# Patient Record
Sex: FEMALE | Race: OTHER | HISPANIC OR LATINO
[De-identification: names, ages, dates, MRNs, and addresses within clinical notes are randomized per-mention and may not be internally consistent; named-entity substitution may affect disease eponyms.]

---

## 2019-08-22 ENCOUNTER — TRANSCRIPTION ENCOUNTER (OUTPATIENT)
Age: 34
End: 2019-08-22

## 2020-04-02 PROBLEM — Z00.00 ENCOUNTER FOR PREVENTIVE HEALTH EXAMINATION: Status: ACTIVE | Noted: 2020-04-02

## 2020-04-10 ENCOUNTER — APPOINTMENT (OUTPATIENT)
Dept: PLASTIC SURGERY | Facility: CLINIC | Age: 35
End: 2020-04-10
Payer: COMMERCIAL

## 2020-04-10 PROCEDURE — 99203 OFFICE O/P NEW LOW 30 MIN: CPT | Mod: 95

## 2020-04-22 ENCOUNTER — APPOINTMENT (OUTPATIENT)
Dept: PLASTIC SURGERY | Facility: CLINIC | Age: 35
End: 2020-04-22
Payer: COMMERCIAL

## 2020-04-22 PROCEDURE — 99214 OFFICE O/P EST MOD 30 MIN: CPT | Mod: 95

## 2020-06-24 ENCOUNTER — APPOINTMENT (OUTPATIENT)
Dept: PLASTIC SURGERY | Facility: CLINIC | Age: 35
End: 2020-06-24
Payer: COMMERCIAL

## 2020-06-24 PROCEDURE — 99214 OFFICE O/P EST MOD 30 MIN: CPT

## 2020-06-29 NOTE — REASON FOR VISIT
[Follow-Up: _____] : a [unfilled] follow-up visit [FreeTextEntry1] : Patient is being seen to discuss upcoming surgery.

## 2020-07-14 ENCOUNTER — APPOINTMENT (OUTPATIENT)
Dept: ENDOCRINOLOGY | Facility: CLINIC | Age: 35
End: 2020-07-14

## 2020-07-27 ENCOUNTER — APPOINTMENT (OUTPATIENT)
Dept: PLASTIC SURGERY | Facility: CLINIC | Age: 35
End: 2020-07-27

## 2020-08-24 ENCOUNTER — APPOINTMENT (OUTPATIENT)
Dept: PLASTIC SURGERY | Facility: CLINIC | Age: 35
End: 2020-08-24

## 2020-08-26 ENCOUNTER — APPOINTMENT (OUTPATIENT)
Dept: PLASTIC SURGERY | Facility: CLINIC | Age: 35
End: 2020-08-26

## 2020-09-30 ENCOUNTER — APPOINTMENT (OUTPATIENT)
Dept: PLASTIC SURGERY | Facility: CLINIC | Age: 35
End: 2020-09-30

## 2020-12-02 ENCOUNTER — APPOINTMENT (OUTPATIENT)
Dept: PLASTIC SURGERY | Facility: CLINIC | Age: 35
End: 2020-12-02

## 2021-01-06 ENCOUNTER — APPOINTMENT (OUTPATIENT)
Dept: PLASTIC SURGERY | Facility: CLINIC | Age: 36
End: 2021-01-06

## 2021-03-12 ENCOUNTER — APPOINTMENT (OUTPATIENT)
Dept: PLASTIC SURGERY | Facility: CLINIC | Age: 36
End: 2021-03-12

## 2021-04-16 ENCOUNTER — APPOINTMENT (OUTPATIENT)
Dept: PLASTIC SURGERY | Facility: CLINIC | Age: 36
End: 2021-04-16
Payer: COMMERCIAL

## 2021-04-16 PROCEDURE — XXXXX: CPT

## 2021-07-09 ENCOUNTER — APPOINTMENT (OUTPATIENT)
Dept: PLASTIC SURGERY | Facility: CLINIC | Age: 36
End: 2021-07-09

## 2021-07-23 ENCOUNTER — APPOINTMENT (OUTPATIENT)
Dept: PLASTIC SURGERY | Facility: CLINIC | Age: 36
End: 2021-07-23

## 2021-07-30 ENCOUNTER — APPOINTMENT (OUTPATIENT)
Dept: PLASTIC SURGERY | Facility: CLINIC | Age: 36
End: 2021-07-30

## 2021-09-02 ENCOUNTER — APPOINTMENT (OUTPATIENT)
Dept: PLASTIC SURGERY | Facility: CLINIC | Age: 36
End: 2021-09-02
Payer: COMMERCIAL

## 2021-09-02 VITALS
HEIGHT: 65 IN | WEIGHT: 153 LBS | RESPIRATION RATE: 16 BRPM | BODY MASS INDEX: 25.49 KG/M2 | HEART RATE: 82 BPM | DIASTOLIC BLOOD PRESSURE: 76 MMHG | SYSTOLIC BLOOD PRESSURE: 116 MMHG | TEMPERATURE: 98.6 F

## 2021-09-02 DIAGNOSIS — Z78.9 OTHER SPECIFIED HEALTH STATUS: ICD-10-CM

## 2021-09-02 PROCEDURE — 99072 ADDL SUPL MATRL&STAF TM PHE: CPT

## 2021-09-02 PROCEDURE — 99214 OFFICE O/P EST MOD 30 MIN: CPT

## 2021-09-07 PROBLEM — Z78.9 NO PERTINENT PAST MEDICAL HISTORY: Status: RESOLVED | Noted: 2021-09-02 | Resolved: 2021-09-07

## 2021-09-07 NOTE — ASSESSMENT
[FreeTextEntry1] : If the patient wishes to proceed with vaginoplasty, I recommend she begin hair removal of the shaft and scrotum, preferably with electrolysis.  She expresses understanding that it may take up to a year for complete clearing of the area.  She will also need 2 letters of assessment from mental health providers in support of surgery.

## 2021-09-07 NOTE — HISTORY OF PRESENT ILLNESS
[FreeTextEntry1] : 36-year-old woman designated male at birth presents for consultation for vaginoplasty.  She states she has been on feminizing hormones for the last 18 years.  She has a history of orchiectomy.  She is specifically interested in vaginal receptive intercourse with a penis.  She is capable of orgasm.  She occasionally tucks with underwear.  She denies any history of hernia.  She has not begun hair removal.  She has a history of HSV but has not had any recent outbreaks.  She has had breast augmentation and FFS.\par \par She states she is currently on welfare.  She states she lives with her mother.  She states her mother would be able to assist her after surgery.  She denies nicotine product use.  She endorses some alcohol use.  She smokes marijuana but agrees to switch to edibles.

## 2021-09-07 NOTE — PHYSICAL EXAM
[de-identified] : NAD.  BMI 25.5. [de-identified] : Normal respiratory effort. [de-identified] : The patient has several scars over the shaft but no active lesions.  She has not been circumcised.  The foreskin cannot be reduced without discomfort.  There are no palpable herniae.  The scrotum is empty.  There are no visible lesions of the scrotum or perineum.  There appears to be sufficient local tissue.

## 2021-09-17 ENCOUNTER — APPOINTMENT (OUTPATIENT)
Dept: PLASTIC SURGERY | Facility: CLINIC | Age: 36
End: 2021-09-17

## 2021-10-29 ENCOUNTER — APPOINTMENT (OUTPATIENT)
Dept: PLASTIC SURGERY | Facility: CLINIC | Age: 36
End: 2021-10-29

## 2021-11-12 ENCOUNTER — APPOINTMENT (OUTPATIENT)
Dept: PLASTIC SURGERY | Facility: CLINIC | Age: 36
End: 2021-11-12
Payer: MEDICAID

## 2021-11-12 PROCEDURE — 99213 OFFICE O/P EST LOW 20 MIN: CPT

## 2021-11-12 PROCEDURE — 99072 ADDL SUPL MATRL&STAF TM PHE: CPT

## 2022-04-08 ENCOUNTER — EMERGENCY (EMERGENCY)
Facility: HOSPITAL | Age: 37
LOS: 1 days | Discharge: HOME | End: 2022-04-08
Attending: STUDENT IN AN ORGANIZED HEALTH CARE EDUCATION/TRAINING PROGRAM | Admitting: STUDENT IN AN ORGANIZED HEALTH CARE EDUCATION/TRAINING PROGRAM
Payer: MEDICAID

## 2022-04-08 VITALS
DIASTOLIC BLOOD PRESSURE: 78 MMHG | SYSTOLIC BLOOD PRESSURE: 120 MMHG | HEIGHT: 65 IN | RESPIRATION RATE: 18 BRPM | HEART RATE: 84 BPM | WEIGHT: 156.53 LBS | OXYGEN SATURATION: 98 % | TEMPERATURE: 98 F

## 2022-04-08 PROCEDURE — 99283 EMERGENCY DEPT VISIT LOW MDM: CPT

## 2022-04-08 RX ORDER — DIPHENHYDRAMINE HCL 50 MG
25 CAPSULE ORAL ONCE
Refills: 0 | Status: COMPLETED | OUTPATIENT
Start: 2022-04-08 | End: 2022-04-09

## 2022-04-08 RX ORDER — FAMOTIDINE 10 MG/ML
20 INJECTION INTRAVENOUS ONCE
Refills: 0 | Status: COMPLETED | OUTPATIENT
Start: 2022-04-08 | End: 2022-04-08

## 2022-04-08 NOTE — ED ADULT NURSE NOTE - NSIMPLEMENTINTERV_GEN_ALL_ED
Implemented All Universal Safety Interventions:  [place] to call system. Call bell, personal items and telephone within reach. Instruct patient to call for assistance. Room bathroom lighting operational. Non-slip footwear when patient is off stretcher. Physically safe environment: no spills, clutter or unnecessary equipment. Stretcher in lowest position, wheels locked, appropriate side rails in place.

## 2022-04-08 NOTE — ED ADULT NURSE NOTE - OBJECTIVE STATEMENT
Pt is transgender women, p/w c/o rash all over her body, no visible rash noted at time of assessment, pt reports itchiness. took one dose of benadryl at home, stated that it didn't help much, thinks that she is allergic to something, but not sure to what Pt is transgender women, p/w c/o rash all over her body since 3 days ago, no visible rash noted at time of assessment, pt reports itchiness. took one dose of benadryl on day 1 at home, stated that it didn't help much, thinks that she is allergic to something, but not sure to what

## 2022-04-09 VITALS
DIASTOLIC BLOOD PRESSURE: 64 MMHG | HEART RATE: 81 BPM | SYSTOLIC BLOOD PRESSURE: 112 MMHG | TEMPERATURE: 98 F | RESPIRATION RATE: 18 BRPM | OXYGEN SATURATION: 98 %

## 2022-04-09 PROCEDURE — 99283 EMERGENCY DEPT VISIT LOW MDM: CPT

## 2022-04-09 RX ORDER — FAMOTIDINE 10 MG/ML
1 INJECTION INTRAVENOUS
Qty: 6 | Refills: 0
Start: 2022-04-09 | End: 2022-04-11

## 2022-04-09 RX ADMIN — FAMOTIDINE 20 MILLIGRAM(S): 10 INJECTION INTRAVENOUS at 00:00

## 2022-04-09 RX ADMIN — Medication 25 MILLIGRAM(S): at 00:00

## 2022-04-09 RX ADMIN — Medication 20 MILLIGRAM(S): at 00:00

## 2022-04-09 NOTE — ED PROVIDER NOTE - CPE EDP GASTRO NORM
----- Message from Marline Hernandez DO sent at 1/15/2022  8:12 AM CST -----  Please let Inocencia know,   I received the results of the recent lab work. Hemoglobin was low which is likely related to iron deficiency. I would recommend she make sure to start a daily iron supplement. Kidney function, liver function, electrolytes were stable. Cholesterol, vitamin b12, and folate were also normal.       
Patient notified as below. Patient states she is taking otc iron 325 mg 2 tabs daily.  
normal...

## 2022-04-09 NOTE — ED PROVIDER NOTE - PATIENT PORTAL LINK FT
You can access the FollowMyHealth Patient Portal offered by [facility] by registering at the following website: [email or web address]. By joining [facility]’s FollowMyHealth portal, you will also be able to view your health information using other applications (apps) compatible with our system.

## 2022-04-09 NOTE — ED PROVIDER NOTE - PHYSICAL EXAMINATION
No rash noted anywhere on body.  Small area of redness lateral to axilla, no papules, hives, or abscess.    No swelling to tongue, lips, face, uvula

## 2022-04-09 NOTE — ED PROVIDER NOTE - CARE PROVIDER_API CALL
KOJO THOMPSON  Internal Medicine  [address] [place] [address]  Phone: [phone]  Fax: ([phone]  Follow Up Time:

## 2022-04-09 NOTE — ED PROVIDER NOTE - CLINICAL SUMMARY MEDICAL DECISION MAKING FREE TEXT BOX
37 year old female p/w rash x 3 days.  Reports itching, no swelling, pain, fever.  Last took Benadryl 3 days ago.  Treat with Benadryl, Prednisone, Pepcid, reassess

## 2022-04-09 NOTE — ED PROVIDER NOTE - NSFOLLOWUPINSTRUCTIONS_ED_ALL_ED_FT
Contact Dermatitis    WHAT YOU NEED TO KNOW:    Contact dermatitis is a skin rash. It develops when you touch something that irritates your skin or causes an allergic reaction.    DISCHARGE INSTRUCTIONS:    Call your local emergency number (911 in the [place]) if:   •You have sudden trouble breathing.      •Your throat swells and you have trouble eating.      •Your face is swollen.      Call your doctor or dermatologist if:   •You have a fever.      •Your blisters are draining pus.      •Your rash spreads or does not get better, even after treatment.      •You have questions or concerns about your condition or care.      Medicines:   •Medicines help decrease itching and swelling. They will be given as a topical medicine to apply to your rash or as a pill.      •Take your medicine as directed. Contact your healthcare provider if you think your medicine is not helping or if you have side effects. Tell him or her if you are allergic to any medicine. Keep a list of the medicines, vitamins, and herbs you take. Include the amounts, and when and why you take them. Bring the list or the pill bottles to follow-up visits. Carry your medicine list with you in case of an emergency.      Manage contact dermatitis:   •Take short baths or showers in cool water. Use mild soap or soap-free cleansers. Add oatmeal, baking soda, or cornstarch to the bath water to help decrease skin irritation.      •Avoid skin irritants, such as makeup, hair products, soaps, and cleansers. Use products that do not contain perfume or dye.      •Apply a cool compress to your rash. This will help soothe your skin.      •Apply lotions or creams to the area. These help keep your skin moist and decrease itching. Apply the lotion or cream right after a lukewarm bath or shower when your skin is still damp. Use products that do not contain a scent.      Follow up with your doctor or dermatologist in 2 to 3 days: Write down your questions so you remember to ask them during your visits. Please take the medication as prescribed and follow up with your primary care doctor.  Return to the ER for persistent rash, itching, shortness of breath, swelling to your face, respiratory distress, or any other concerns.     Contact Dermatitis    WHAT YOU NEED TO KNOW:    Contact dermatitis is a skin rash. It develops when you touch something that irritates your skin or causes an allergic reaction.    DISCHARGE INSTRUCTIONS:    Call your local emergency number (911 in the [place]) if:   •You have sudden trouble breathing.      •Your throat swells and you have trouble eating.      •Your face is swollen.      Call your doctor or dermatologist if:   •You have a fever.      •Your blisters are draining pus.      •Your rash spreads or does not get better, even after treatment.      •You have questions or concerns about your condition or care.      Medicines:   •Medicines help decrease itching and swelling. They will be given as a topical medicine to apply to your rash or as a pill.      •Take your medicine as directed. Contact your healthcare provider if you think your medicine is not helping or if you have side effects. Tell him or her if you are allergic to any medicine. Keep a list of the medicines, vitamins, and herbs you take. Include the amounts, and when and why you take them. Bring the list or the pill bottles to follow-up visits. Carry your medicine list with you in case of an emergency.      Manage contact dermatitis:   •Take short baths or showers in cool water. Use mild soap or soap-free cleansers. Add oatmeal, baking soda, or cornstarch to the bath water to help decrease skin irritation.      •Avoid skin irritants, such as makeup, hair products, soaps, and cleansers. Use products that do not contain perfume or dye.      •Apply a cool compress to your rash. This will help soothe your skin.      •Apply lotions or creams to the area. These help keep your skin moist and decrease itching. Apply the lotion or cream right after a lukewarm bath or shower when your skin is still damp. Use products that do not contain a scent.      Follow up with your doctor or dermatologist in 2 to 3 days: Write down your questions so you remember to ask them during your visits.

## 2022-04-09 NOTE — ED PROVIDER NOTE - CARE PROVIDERS DIRECT ADDRESSES
[email or web address]

## 2022-04-09 NOTE — ED PROVIDER NOTE - OBJECTIVE STATEMENT
37 year old female presents with rash x 3 days.  States she had redness to her central chest 3 days ago.  There was no raised lesions or hives but she reports that it was very itchy.  She took Benadryl with relief.  Now she feels that she has a rash "spreading all over my body."  Reports itching to her b/l arms, legs, and abdomen.  She has not taken any more Benadryl or any other medication for the itching.  Denies chest pain, SOB, swelling to her face, tongue.  Currently living in various hotels so she has used new shampoos and bedsheet.

## 2022-07-11 ENCOUNTER — APPOINTMENT (OUTPATIENT)
Dept: PLASTIC SURGERY | Facility: CLINIC | Age: 37
End: 2022-07-11
Payer: COMMERCIAL

## 2022-07-11 PROCEDURE — 99213 OFFICE O/P EST LOW 20 MIN: CPT | Mod: 95

## 2022-07-11 PROCEDURE — 99203 OFFICE O/P NEW LOW 30 MIN: CPT | Mod: 95

## 2022-08-22 ENCOUNTER — APPOINTMENT (OUTPATIENT)
Dept: PLASTIC SURGERY | Facility: CLINIC | Age: 37
End: 2022-08-22
Payer: COMMERCIAL

## 2022-08-22 PROCEDURE — XXXXX: CPT | Mod: 1L

## 2022-08-24 NOTE — PHYSICAL EXAM
[de-identified] : Alert, calm, cooperative.\par  [de-identified] : Asymmetric hairline and brow bossing. [de-identified] : bulbous tip with poor tip support, long nose,\par intranasal exam showed enlarged turbinates and deviated caudal septum; + upper lip lesion\par  [de-identified] :  +Excess submental fat [de-identified] : Respirations even and unlabored.\par

## 2022-08-24 NOTE — HISTORY OF PRESENT ILLNESS
[Medical Office: ([facility])___] : at the medical office located in  [Verbal consent obtained from patient] : the patient, [unfilled] [FreeTextEntry1] : MADDIE is a 37 year old F patient that presents on a telehealth call for a follow up appointment to confirm her facial feminization surgery procedures. Patient states that she is working on retrieving her letters. Patient denies having any significant concerns or complaints.\par

## 2022-12-23 ENCOUNTER — OUTPATIENT (OUTPATIENT)
Dept: OUTPATIENT SERVICES | Facility: HOSPITAL | Age: 37
LOS: 1 days | End: 2022-12-23

## 2022-12-23 VITALS
WEIGHT: 141.98 LBS | HEIGHT: 64.5 IN | SYSTOLIC BLOOD PRESSURE: 110 MMHG | OXYGEN SATURATION: 99 % | RESPIRATION RATE: 16 BRPM | HEART RATE: 70 BPM | TEMPERATURE: 97 F | DIASTOLIC BLOOD PRESSURE: 60 MMHG

## 2022-12-23 DIAGNOSIS — F64.9 GENDER IDENTITY DISORDER, UNSPECIFIED: ICD-10-CM

## 2022-12-23 DIAGNOSIS — Z98.82 BREAST IMPLANT STATUS: Chronic | ICD-10-CM

## 2022-12-23 DIAGNOSIS — Z90.79 ACQUIRED ABSENCE OF OTHER GENITAL ORGAN(S): Chronic | ICD-10-CM

## 2022-12-23 DIAGNOSIS — Z98.890 OTHER SPECIFIED POSTPROCEDURAL STATES: Chronic | ICD-10-CM

## 2022-12-23 LAB
ANION GAP SERPL CALC-SCNC: 13 MMOL/L — SIGNIFICANT CHANGE UP (ref 7–14)
BLD GP AB SCN SERPL QL: NEGATIVE — SIGNIFICANT CHANGE UP
BUN SERPL-MCNC: 15 MG/DL — SIGNIFICANT CHANGE UP (ref 7–23)
CALCIUM SERPL-MCNC: 9.2 MG/DL — SIGNIFICANT CHANGE UP (ref 8.4–10.5)
CHLORIDE SERPL-SCNC: 103 MMOL/L — SIGNIFICANT CHANGE UP (ref 98–107)
CO2 SERPL-SCNC: 20 MMOL/L — LOW (ref 22–31)
CREAT SERPL-MCNC: 0.7 MG/DL — SIGNIFICANT CHANGE UP (ref 0.5–1.3)
EGFR: 116 ML/MIN/1.73M2 — SIGNIFICANT CHANGE UP
GLUCOSE SERPL-MCNC: 92 MG/DL — SIGNIFICANT CHANGE UP (ref 70–99)
HCT VFR BLD CALC: 40.2 % — SIGNIFICANT CHANGE UP (ref 34.5–45)
HGB BLD-MCNC: 13.2 G/DL — SIGNIFICANT CHANGE UP (ref 11.5–15.5)
MCHC RBC-ENTMCNC: 29.3 PG — SIGNIFICANT CHANGE UP (ref 27–34)
MCHC RBC-ENTMCNC: 32.8 GM/DL — SIGNIFICANT CHANGE UP (ref 32–36)
MCV RBC AUTO: 89.1 FL — SIGNIFICANT CHANGE UP (ref 80–100)
NRBC # BLD: 0 /100 WBCS — SIGNIFICANT CHANGE UP (ref 0–0)
NRBC # FLD: 0 K/UL — SIGNIFICANT CHANGE UP (ref 0–0)
PLATELET # BLD AUTO: 317 K/UL — SIGNIFICANT CHANGE UP (ref 150–400)
POTASSIUM SERPL-MCNC: 4.3 MMOL/L — SIGNIFICANT CHANGE UP (ref 3.5–5.3)
POTASSIUM SERPL-SCNC: 4.3 MMOL/L — SIGNIFICANT CHANGE UP (ref 3.5–5.3)
RBC # BLD: 4.51 M/UL — SIGNIFICANT CHANGE UP (ref 3.8–5.2)
RBC # FLD: 13.3 % — SIGNIFICANT CHANGE UP (ref 10.3–14.5)
RH IG SCN BLD-IMP: POSITIVE — SIGNIFICANT CHANGE UP
SODIUM SERPL-SCNC: 136 MMOL/L — SIGNIFICANT CHANGE UP (ref 135–145)
WBC # BLD: 6.11 K/UL — SIGNIFICANT CHANGE UP (ref 3.8–10.5)
WBC # FLD AUTO: 6.11 K/UL — SIGNIFICANT CHANGE UP (ref 3.8–10.5)

## 2022-12-23 NOTE — H&P PST ADULT - PROBLEM SELECTOR PLAN 1
Patient tentatively scheduled for frontal sinus anterior wall setback, orbital reconstruction bilateral, browlift revision, supra orbital bar recontouring bilateral, tarsorrhaphy, bilateral rhinoplasty revision, SMR, Cartilage grafting of nose, submental fat removal, platysmaplasty, submandibular gland resection, excision of lip lesion on 1/5/23.    Pre-op instructions provided. Pt given verbal and written instructions with teach back on pepcid. Pt verbalized understanding with return demonstration.   Preop Covid PCR test ordered .Instructions regarding covid PCR test to be obtained 3- 5 days prior to surgery and locations for covid testing site provided. Pt verbalized understanding.

## 2022-12-23 NOTE — H&P PST ADULT - NEGATIVE CARDIOVASCULAR SYMPTOMS
Walks 3 to 4 blocks, climbs 2 flight of stairs, ADLs METs- 4/no chest pain/no palpitations/no dyspnea on exertion/no peripheral edema

## 2022-12-23 NOTE — H&P PST ADULT - NSICDXPASTSURGICALHX_GEN_ALL_CORE_FT
PAST SURGICAL HISTORY:  H/O breast implant     History of facial surgery     History of orchiectomy

## 2022-12-23 NOTE — H&P PST ADULT - HISTORY OF PRESENT ILLNESS
37 year old transgender with no PMH  presents to Presurgical testing with diagnosis of Gender identify disorder scheduled for frontal sinus anterior wall setback, orbital reconstruction bilateral, browlift revision, supra orbital bar recontouring bilateral, tarsorrhaphy, bilateral rhinoplasty revision, SMR, Cartilage grafting of nose, submental fat removal, platysmaplasty, submandibular gland resection, excision of lip lesion.

## 2022-12-23 NOTE — H&P PST ADULT - NSANTHOSAYNRD_GEN_A_CORE
No. [place] screening performed.  STOP BANG Legend: 0-2 = LOW Risk; 3-4 = INTERMEDIATE Risk; 5-8 = HIGH Risk

## 2022-12-29 ENCOUNTER — APPOINTMENT (OUTPATIENT)
Dept: PLASTIC SURGERY | Facility: CLINIC | Age: 37
End: 2022-12-29
Payer: COMMERCIAL

## 2022-12-29 VITALS
SYSTOLIC BLOOD PRESSURE: 123 MMHG | HEIGHT: 65 IN | HEART RATE: 94 BPM | DIASTOLIC BLOOD PRESSURE: 80 MMHG | OXYGEN SATURATION: 95 % | TEMPERATURE: 97.4 F

## 2022-12-29 PROCEDURE — 99213 OFFICE O/P EST LOW 20 MIN: CPT

## 2022-12-29 PROCEDURE — 99072 ADDL SUPL MATRL&STAF TM PHE: CPT

## 2023-01-03 ENCOUNTER — NON-APPOINTMENT (OUTPATIENT)
Age: 38
End: 2023-01-03

## 2023-01-05 ENCOUNTER — APPOINTMENT (OUTPATIENT)
Dept: PLASTIC SURGERY | Facility: HOSPITAL | Age: 38
End: 2023-01-05

## 2023-01-10 PROBLEM — Z78.9 OTHER SPECIFIED HEALTH STATUS: Chronic | Status: ACTIVE | Noted: 2022-12-23

## 2023-01-13 ENCOUNTER — APPOINTMENT (OUTPATIENT)
Dept: PLASTIC SURGERY | Facility: CLINIC | Age: 38
End: 2023-01-13

## 2023-02-19 ENCOUNTER — NON-APPOINTMENT (OUTPATIENT)
Age: 38
End: 2023-02-19

## 2023-03-02 ENCOUNTER — APPOINTMENT (OUTPATIENT)
Dept: PLASTIC SURGERY | Facility: HOSPITAL | Age: 38
End: 2023-03-02

## 2023-03-10 ENCOUNTER — APPOINTMENT (OUTPATIENT)
Dept: PLASTIC SURGERY | Facility: CLINIC | Age: 38
End: 2023-03-10

## 2023-03-31 ENCOUNTER — OUTPATIENT (OUTPATIENT)
Dept: OUTPATIENT SERVICES | Facility: HOSPITAL | Age: 38
LOS: 1 days | End: 2023-03-31

## 2023-03-31 VITALS
WEIGHT: 145.95 LBS | DIASTOLIC BLOOD PRESSURE: 68 MMHG | TEMPERATURE: 98 F | OXYGEN SATURATION: 96 % | SYSTOLIC BLOOD PRESSURE: 113 MMHG | RESPIRATION RATE: 16 BRPM | HEIGHT: 65 IN | HEART RATE: 84 BPM

## 2023-03-31 DIAGNOSIS — Z90.79 ACQUIRED ABSENCE OF OTHER GENITAL ORGAN(S): Chronic | ICD-10-CM

## 2023-03-31 DIAGNOSIS — Z98.890 OTHER SPECIFIED POSTPROCEDURAL STATES: Chronic | ICD-10-CM

## 2023-03-31 DIAGNOSIS — F64.9 GENDER IDENTITY DISORDER, UNSPECIFIED: ICD-10-CM

## 2023-03-31 DIAGNOSIS — Z98.82 BREAST IMPLANT STATUS: Chronic | ICD-10-CM

## 2023-03-31 LAB
ANION GAP SERPL CALC-SCNC: 11 MMOL/L — SIGNIFICANT CHANGE UP (ref 7–14)
BLD GP AB SCN SERPL QL: NEGATIVE — SIGNIFICANT CHANGE UP
BUN SERPL-MCNC: 16 MG/DL — SIGNIFICANT CHANGE UP (ref 7–23)
CALCIUM SERPL-MCNC: 8.7 MG/DL — SIGNIFICANT CHANGE UP (ref 8.4–10.5)
CHLORIDE SERPL-SCNC: 106 MMOL/L — SIGNIFICANT CHANGE UP (ref 98–107)
CO2 SERPL-SCNC: 21 MMOL/L — LOW (ref 22–31)
CREAT SERPL-MCNC: 0.98 MG/DL — SIGNIFICANT CHANGE UP (ref 0.5–1.3)
EGFR: 77 ML/MIN/1.73M2 — SIGNIFICANT CHANGE UP
GLUCOSE SERPL-MCNC: 89 MG/DL — SIGNIFICANT CHANGE UP (ref 70–99)
HCT VFR BLD CALC: 37 % — SIGNIFICANT CHANGE UP (ref 34.5–45)
HGB BLD-MCNC: 11.5 G/DL — SIGNIFICANT CHANGE UP (ref 11.5–15.5)
MCHC RBC-ENTMCNC: 27.6 PG — SIGNIFICANT CHANGE UP (ref 27–34)
MCHC RBC-ENTMCNC: 31.1 GM/DL — LOW (ref 32–36)
MCV RBC AUTO: 88.9 FL — SIGNIFICANT CHANGE UP (ref 80–100)
NRBC # BLD: 0 /100 WBCS — SIGNIFICANT CHANGE UP (ref 0–0)
NRBC # FLD: 0 K/UL — SIGNIFICANT CHANGE UP (ref 0–0)
PLATELET # BLD AUTO: 260 K/UL — SIGNIFICANT CHANGE UP (ref 150–400)
POTASSIUM SERPL-MCNC: 4 MMOL/L — SIGNIFICANT CHANGE UP (ref 3.5–5.3)
POTASSIUM SERPL-SCNC: 4 MMOL/L — SIGNIFICANT CHANGE UP (ref 3.5–5.3)
RBC # BLD: 4.16 M/UL — SIGNIFICANT CHANGE UP (ref 3.8–5.2)
RBC # FLD: 13 % — SIGNIFICANT CHANGE UP (ref 10.3–14.5)
RH IG SCN BLD-IMP: POSITIVE — SIGNIFICANT CHANGE UP
SODIUM SERPL-SCNC: 138 MMOL/L — SIGNIFICANT CHANGE UP (ref 135–145)
WBC # BLD: 8.3 K/UL — SIGNIFICANT CHANGE UP (ref 3.8–10.5)
WBC # FLD AUTO: 8.3 K/UL — SIGNIFICANT CHANGE UP (ref 3.8–10.5)

## 2023-03-31 RX ORDER — ESTROGENS, CONJUGATED 0.625 MG
1 TABLET ORAL
Qty: 0 | Refills: 0 | DISCHARGE

## 2023-03-31 RX ORDER — SODIUM CHLORIDE 9 MG/ML
1000 INJECTION, SOLUTION INTRAVENOUS
Refills: 0 | Status: DISCONTINUED | OUTPATIENT
Start: 2023-04-13 | End: 2023-04-14

## 2023-03-31 NOTE — H&P PST ADULT - HISTORY OF PRESENT ILLNESS
38 year old transgender with no PMH presents to Presurgical testing with diagnosis of Gender identify disorder scheduled for frontal sinus anterior wall setback, orbital reconstruction bilateral, browlift revision, supra orbital bar recontouring bilateral, tarsorrhaphy, bilateral rhinoplasty revision, SMR, Cartilage grafting of nose, submental fat removal, platysmaplasty, submandibular gland resection, excision of lip lesion. Pt states procedure was rescheduled from 1/2023 due to transportation issue. Pt denies changes in health since previous PST visit.

## 2023-03-31 NOTE — H&P PST ADULT - PROBLEM SELECTOR PLAN 1
Patient tentatively scheduled for  frontal sinus anterior wall setback, orbital reconstruction bilateral, browlift revision, supra orbital bar recontouring bilateral, tarsorrhaphy, bilateral rhinoplasty revision, SMR, Cartilage grafting of nose, submental fat removal, platysmaplasty, submandibular gland resection, excision of lip lesion for 4/13/23. Pre-op instructions provided. Pt given verbal and written instructions with teach back on pepcid. Pt verbalized understanding with return demonstration.     Pt instructed to obtain a COVID-19 PCR 3-5 days prior to the procedure. COVID-19 PCR order placed. Pt was provided with a list of COVID-19 PCR swabbing locations and hours of operation. Pt stated understanding.     CBC BMP T&S Done.  No further evaluations requested.

## 2023-04-06 RX ORDER — OXYCODONE 5 MG/1
5 TABLET ORAL
Qty: 12 | Refills: 0 | Status: ACTIVE | COMMUNITY
Start: 2023-04-06 | End: 1900-01-01

## 2023-04-06 RX ORDER — GABAPENTIN 300 MG/1
300 CAPSULE ORAL TWICE DAILY
Qty: 10 | Refills: 0 | Status: ACTIVE | COMMUNITY
Start: 2023-04-06 | End: 1900-01-01

## 2023-04-06 RX ORDER — IBUPROFEN 600 MG/1
600 TABLET, FILM COATED ORAL EVERY 6 HOURS
Qty: 28 | Refills: 0 | Status: ACTIVE | COMMUNITY
Start: 2023-04-06 | End: 1900-01-01

## 2023-04-09 ENCOUNTER — NON-APPOINTMENT (OUTPATIENT)
Age: 38
End: 2023-04-09

## 2023-04-12 ENCOUNTER — TRANSCRIPTION ENCOUNTER (OUTPATIENT)
Age: 38
End: 2023-04-12

## 2023-04-12 NOTE — ASU PATIENT PROFILE, ADULT - FALL HARM RISK - UNIVERSAL INTERVENTIONS
Bed in lowest position, wheels locked, appropriate side rails in place/Call bell, personal items and telephone in reach/Instruct patient to call for assistance before getting out of bed or chair/Non-slip footwear when patient is out of bed/[place] to call system/Physically safe environment - no spills, clutter or unnecessary equipment/Purposeful Proactive Rounding/Room/bathroom lighting operational, light cord in reach

## 2023-04-12 NOTE — ASU PATIENT PROFILE, ADULT - AS SC BRADEN ACTIVITY
You will be due for colonoscopy in March 2021.      Next fasting labs to recheck your glucose control should be in three months.    We will try to order you the Sensor Velia glucose monitoring device.    Schedule a video visit to review your next labs after completed.   (4) walks frequently

## 2023-04-13 ENCOUNTER — INPATIENT (INPATIENT)
Facility: HOSPITAL | Age: 38
LOS: 1 days | Discharge: HOME | End: 2023-04-15
Attending: SURGERY | Admitting: SURGERY
Payer: MEDICAID

## 2023-04-13 ENCOUNTER — APPOINTMENT (OUTPATIENT)
Dept: PLASTIC SURGERY | Facility: HOSPITAL | Age: 38
End: 2023-04-13

## 2023-04-13 VITALS
HEART RATE: 69 BPM | SYSTOLIC BLOOD PRESSURE: 103 MMHG | TEMPERATURE: 98 F | RESPIRATION RATE: 16 BRPM | DIASTOLIC BLOOD PRESSURE: 74 MMHG | OXYGEN SATURATION: 98 % | HEIGHT: 65 IN | WEIGHT: 145.95 LBS

## 2023-04-13 DIAGNOSIS — Z90.79 ACQUIRED ABSENCE OF OTHER GENITAL ORGAN(S): Chronic | ICD-10-CM

## 2023-04-13 DIAGNOSIS — Z98.890 OTHER SPECIFIED POSTPROCEDURAL STATES: Chronic | ICD-10-CM

## 2023-04-13 DIAGNOSIS — Z98.82 BREAST IMPLANT STATUS: Chronic | ICD-10-CM

## 2023-04-13 DIAGNOSIS — F64.9 GENDER IDENTITY DISORDER, UNSPECIFIED: ICD-10-CM

## 2023-04-13 PROCEDURE — 40816 EXCISION OF MOUTH LESION: CPT

## 2023-04-13 PROCEDURE — 30450 REVISION OF NOSE: CPT

## 2023-04-13 PROCEDURE — 21139 RDCTJ FOREHEAD CNTRG&SETBACK: CPT

## 2023-04-13 PROCEDURE — 15876 SUCTION LIPECTOMY HEAD&NECK: CPT

## 2023-04-13 PROCEDURE — 21172 RCNST SUPR-LAT ORB RM&LW FHD: CPT

## 2023-04-13 PROCEDURE — 15824 RHYTIDECTOMY FOREHEAD: CPT

## 2023-04-13 PROCEDURE — 20912 REMOVE CARTILAGE FOR GRAFT: CPT | Mod: 59

## 2023-04-13 PROCEDURE — 67875 CLOSURE OF EYELID BY SUTURE: CPT

## 2023-04-13 PROCEDURE — 15839 EXC EXCESSIVE SKN OTHER AREA: CPT

## 2023-04-13 PROCEDURE — 21256 RECONSTRUCTION OF ORBIT: CPT

## 2023-04-13 PROCEDURE — 30520 REPAIR OF NASAL SEPTUM: CPT

## 2023-04-13 PROCEDURE — 15825 RHYTDCT NCK PLTYSML TGHTG: CPT

## 2023-04-13 RX ORDER — CEFAZOLIN SODIUM 1 G
2000 VIAL (EA) INJECTION EVERY 8 HOURS
Refills: 0 | Status: DISCONTINUED | OUTPATIENT
Start: 2023-04-13 | End: 2023-04-15

## 2023-04-13 RX ORDER — SENNA PLUS 8.6 MG/1
2 TABLET ORAL AT BEDTIME
Refills: 0 | Status: DISCONTINUED | OUTPATIENT
Start: 2023-04-13 | End: 2023-04-15

## 2023-04-13 RX ORDER — ACETAMINOPHEN 500 MG
975 TABLET ORAL EVERY 6 HOURS
Refills: 0 | Status: DISCONTINUED | OUTPATIENT
Start: 2023-04-13 | End: 2023-04-15

## 2023-04-13 RX ORDER — OXYCODONE HYDROCHLORIDE 5 MG/1
5 TABLET ORAL EVERY 4 HOURS
Refills: 0 | Status: DISCONTINUED | OUTPATIENT
Start: 2023-04-13 | End: 2023-04-15

## 2023-04-13 RX ORDER — DIPHENHYDRAMINE HCL 50 MG
25 CAPSULE ORAL EVERY 4 HOURS
Refills: 0 | Status: DISCONTINUED | OUTPATIENT
Start: 2023-04-13 | End: 2023-04-15

## 2023-04-13 RX ORDER — HYDROMORPHONE HYDROCHLORIDE 2 MG/ML
0.5 INJECTION INTRAMUSCULAR; INTRAVENOUS; SUBCUTANEOUS EVERY 4 HOURS
Refills: 0 | Status: DISCONTINUED | OUTPATIENT
Start: 2023-04-13 | End: 2023-04-15

## 2023-04-13 RX ORDER — ESTROGENS, CONJUGATED 0.625 MG
2 TABLET ORAL
Refills: 0 | DISCHARGE

## 2023-04-13 RX ORDER — ONDANSETRON 8 MG/1
4 TABLET, FILM COATED ORAL
Refills: 0 | Status: DISCONTINUED | OUTPATIENT
Start: 2023-04-13 | End: 2023-04-13

## 2023-04-13 RX ORDER — FENTANYL CITRATE 50 UG/ML
50 INJECTION INTRAVENOUS
Refills: 0 | Status: DISCONTINUED | OUTPATIENT
Start: 2023-04-13 | End: 2023-04-13

## 2023-04-13 RX ORDER — OXYCODONE HYDROCHLORIDE 5 MG/1
10 TABLET ORAL EVERY 4 HOURS
Refills: 0 | Status: DISCONTINUED | OUTPATIENT
Start: 2023-04-13 | End: 2023-04-15

## 2023-04-13 RX ORDER — DEXAMETHASONE 0.5 MG/5ML
8 ELIXIR ORAL EVERY 12 HOURS
Refills: 0 | Status: COMPLETED | OUTPATIENT
Start: 2023-04-13 | End: 2023-04-15

## 2023-04-13 RX ORDER — CALCIUM CARBONATE 500(1250)
1 TABLET ORAL EVERY 4 HOURS
Refills: 0 | Status: DISCONTINUED | OUTPATIENT
Start: 2023-04-13 | End: 2023-04-15

## 2023-04-13 RX ORDER — DIAZEPAM 5 MG
5 TABLET ORAL EVERY 8 HOURS
Refills: 0 | Status: DISCONTINUED | OUTPATIENT
Start: 2023-04-13 | End: 2023-04-14

## 2023-04-13 RX ORDER — ACETAMINOPHEN 500 MG
1000 TABLET ORAL EVERY 8 HOURS
Refills: 0 | Status: COMPLETED | OUTPATIENT
Start: 2023-04-13 | End: 2023-04-14

## 2023-04-13 RX ORDER — METOCLOPRAMIDE HCL 10 MG
10 TABLET ORAL EVERY 8 HOURS
Refills: 0 | Status: DISCONTINUED | OUTPATIENT
Start: 2023-04-13 | End: 2023-04-15

## 2023-04-13 RX ADMIN — SODIUM CHLORIDE 30 MILLILITER(S): 9 INJECTION, SOLUTION INTRAVENOUS at 15:09

## 2023-04-13 RX ADMIN — Medication 400 MILLIGRAM(S): at 17:00

## 2023-04-13 RX ADMIN — Medication 100 MILLIGRAM(S): at 15:34

## 2023-04-13 RX ADMIN — Medication 1000 MILLIGRAM(S): at 17:45

## 2023-04-13 RX ADMIN — Medication 101.6 MILLIGRAM(S): at 18:14

## 2023-04-13 RX ADMIN — Medication 5 MILLIGRAM(S): at 15:09

## 2023-04-13 RX ADMIN — Medication 1 DROP(S): at 18:14

## 2023-04-13 NOTE — PATIENT PROFILE ADULT - FALL HARM RISK - HARM RISK INTERVENTIONS

## 2023-04-13 NOTE — BRIEF OPERATIVE NOTE - OPERATION/FINDINGS
Slightly prominent brow and lateral orbital rim type III; excess submental fat and skin; upturned, slightly wide nose.

## 2023-04-13 NOTE — BRIEF OPERATIVE NOTE - NSICDXBRIEFPROCEDURE_GEN_ALL_CORE_FT
PROCEDURES:  Revision, rhinoplasty, major 13-Apr-2023 11:45:42  Ashley Dowd  Rhinoplasty using cartilage graft 13-Apr-2023 11:45:53  Ashley Dowd  SMR - Submucous resection 13-Apr-2023 11:45:55  Ashley Dowd  Browlift 13-Apr-2023 11:46:01  Ashley Dowd  Orbital reconstruction 13-Apr-2023 11:46:06  Ashley Dowd  Excision, excess skin 13-Apr-2023 11:46:09  Ashley Dowd

## 2023-04-14 ENCOUNTER — TRANSCRIPTION ENCOUNTER (OUTPATIENT)
Age: 38
End: 2023-04-14

## 2023-04-14 PROCEDURE — 93010 ELECTROCARDIOGRAM REPORT: CPT

## 2023-04-14 RX ORDER — CHLORHEXIDINE GLUCONATE 213 G/1000ML
5 SOLUTION TOPICAL
Refills: 0 | Status: DISCONTINUED | OUTPATIENT
Start: 2023-04-14 | End: 2023-04-14

## 2023-04-14 RX ORDER — CHLORHEXIDINE GLUCONATE 213 G/1000ML
15 SOLUTION TOPICAL
Refills: 0 | Status: DISCONTINUED | OUTPATIENT
Start: 2023-04-14 | End: 2023-04-15

## 2023-04-14 RX ORDER — PETROLATUM,WHITE
1 JELLY (GRAM) TOPICAL THREE TIMES A DAY
Refills: 0 | Status: DISCONTINUED | OUTPATIENT
Start: 2023-04-14 | End: 2023-04-15

## 2023-04-14 RX ORDER — BENZOCAINE AND MENTHOL 5; 1 G/100ML; G/100ML
6 LIQUID ORAL EVERY 4 HOURS
Refills: 0 | Status: DISCONTINUED | OUTPATIENT
Start: 2023-04-14 | End: 2023-04-14

## 2023-04-14 RX ORDER — BENZOCAINE AND MENTHOL 5; 1 G/100ML; G/100ML
1 LIQUID ORAL EVERY 4 HOURS
Refills: 0 | Status: DISCONTINUED | OUTPATIENT
Start: 2023-04-14 | End: 2023-04-15

## 2023-04-14 RX ORDER — DIAZEPAM 5 MG
5 TABLET ORAL EVERY 6 HOURS
Refills: 0 | Status: DISCONTINUED | OUTPATIENT
Start: 2023-04-14 | End: 2023-04-15

## 2023-04-14 RX ORDER — ONDANSETRON 8 MG/1
4 TABLET, FILM COATED ORAL EVERY 6 HOURS
Refills: 0 | Status: DISCONTINUED | OUTPATIENT
Start: 2023-04-14 | End: 2023-04-15

## 2023-04-14 RX ADMIN — Medication 101.6 MILLIGRAM(S): at 17:27

## 2023-04-14 RX ADMIN — CHLORHEXIDINE GLUCONATE 15 MILLILITER(S): 213 SOLUTION TOPICAL at 17:27

## 2023-04-14 RX ADMIN — Medication 101.6 MILLIGRAM(S): at 05:56

## 2023-04-14 RX ADMIN — Medication 100 MILLIGRAM(S): at 00:58

## 2023-04-14 RX ADMIN — Medication 400 MILLIGRAM(S): at 00:59

## 2023-04-14 RX ADMIN — Medication 1000 MILLIGRAM(S): at 01:29

## 2023-04-14 RX ADMIN — Medication 400 MILLIGRAM(S): at 08:25

## 2023-04-14 RX ADMIN — Medication 1000 MILLIGRAM(S): at 08:55

## 2023-04-14 RX ADMIN — Medication 100 MILLIGRAM(S): at 09:44

## 2023-04-14 RX ADMIN — Medication 5 MILLIGRAM(S): at 08:25

## 2023-04-14 RX ADMIN — Medication 1000 MILLIGRAM(S): at 17:06

## 2023-04-14 RX ADMIN — Medication 100 MILLIGRAM(S): at 16:01

## 2023-04-14 RX ADMIN — Medication 400 MILLIGRAM(S): at 16:36

## 2023-04-14 NOTE — DISCHARGE NOTE PROVIDER - NSDCMRMEDTOKEN_GEN_ALL_CORE_FT
williamock root 1 tab once per week last dose on 4/5/23:   estrogen injection every two weeks:   Premarin 1.25 mg oral tablet: 2 orally once a day  seamoss 1 tab weekly last dose 4/5/23:    williamock root 1 tab once per week last dose on 4/5/23:   estrogen injection every two weeks:   oxyCODONE 5 mg oral tablet: 1 tab(s) orally every 6 hours as needed for  severe pain For breakthrough pain not controlled by tyelnol MDD: 4  Premarin 1.25 mg oral tablet: 2 orally once a day  seamoss 1 tab weekly last dose 4/5/23:

## 2023-04-14 NOTE — DISCHARGE NOTE PROVIDER - NSDCFUSCHEDAPPT_GEN_ALL_CORE_FT
Jose Luis Brasher  [facility] Physician [facility]  [facility] [address] [place] [address]  Scheduled Appointment: 04/28/2023

## 2023-04-14 NOTE — PROVIDER CONTACT NOTE (OTHER) - ACTION/TREATMENT ORDERED:
MD Holley of Plastics notified. MD stated he will place order for EKG and to send copy of EKG on teams. Will pass on in report to night RN for followup.

## 2023-04-14 NOTE — DISCHARGE NOTE PROVIDER - CARE PROVIDER_API CALL
Jose Luis Brasher (MD)  Plastic Surgery; Surgery  [address], [address]  [place] [address]  Phone: [phone]  Fax: [phone]  Follow Up Time: 2 weeks

## 2023-04-14 NOTE — DISCHARGE NOTE PROVIDER - NSDCCPCAREPLAN_GEN_ALL_CORE_FT
PRINCIPAL DISCHARGE DIAGNOSIS  Diagnosis: Other gender identity disorders  Assessment and Plan of Treatment:

## 2023-04-14 NOTE — PROVIDER CONTACT NOTE (OTHER) - ASSESSMENT
Pt observed laying in bed with ice on face over her eyes. Pt does not show any signs and symptoms of distress. Pt's BP is 113/74. HR 76. When asked the pain level of her chest pain, patient states her chest pain is now gone. Pt states her pain "went away but was there earlier". Pt states "this happens whenever she takes tylenol."

## 2023-04-14 NOTE — DISCHARGE NOTE PROVIDER - HOSPITAL COURSE
38y Female was admitted to [facility] on 04-13-23. The patient has a PMHx of  and underwent facial feminization surgery including rhinoplasty revision, SMR, browlift, orbital reconstruction and excess skin excision in the OR. Postoperatively the patient recovered in the PACU, was hemodynamically stable, and was sent to the floor. The patient's pain was controlled by IV pain medications and they were transitioned to PO narcotics. The patient was advanced to soft diet and tolerated it well. The patient was hemodynamically stable and placed on home medications. The patient was told to follow up with Dr. SADE Brasher in 1-2 weeks and had no other issues. Minoo is a 39yo F who underwent facial feminization surgery including rhinoplasty revision, SMR, browlift, orbital reconstruction and excess skin excision in the OR in the OR. The patient tolerated the procedure well. Post-operatively the patient was sent to the PACU. The patient was hemodynamically stable and was transferred to a surgical floor. Patient tolerated operation well and there were no post operative complications identified. The patient's pain was controlled by IV pain medications and then by PO pain medications. The patient was advanced to a regular diet and tolerated it well. The patient was placed on home medications. At the time of discharge, the patient was hemodynamically stable, was tolerating PO diet, was voiding urine and passing stool, was ambulating, and was comfortable with adequate pain control. The patient was instructed to follow up with Dr. Brasher within 1-2 weeks after discharge from the hospital. The patient/family felt comfortable with discharge. The patient was discharged home with a prescription for _. The patient had no other issues.      Minoo is a 37yo F who underwent facial feminization surgery including rhinoplasty revision, SMR, browlift, orbital reconstruction and excess skin excision in the OR in the OR. The patient tolerated the procedure well. Post-operatively the patient was sent to the PACU. The patient was hemodynamically stable and was transferred to a surgical floor. Patient tolerated operation well and there were no post operative complications identified. The patient's pain was controlled by IV pain medications and then by PO pain medications. The patient was advanced to a regular diet and tolerated it well. The patient was placed on home medications. At the time of discharge, the patient was hemodynamically stable, was tolerating PO diet, was voiding urine and passing stool, was ambulating, and was comfortable with adequate pain control. The patient was instructed to follow up with Dr. Brasher within 1-2 weeks after discharge from the hospital. The patient/family felt comfortable with discharge. The patient was discharged home with a prescription for oxycodone for breakthrough pain. The patient had no other issues.      Minoo is a 37yo F who underwent facial feminization surgery including rhinoplasty revision, SMR, browlift, orbital reconstruction and excess skin excision in the OR in the OR. The patient tolerated the procedure well. Post-operatively the patient was sent to the PACU. The patient was hemodynamically stable and was transferred to a surgical floor. Patient tolerated operation well and there were no post operative complications identified. The patient's pain was controlled by IV pain medications and then by PO pain medications. The patient was advanced to a regular diet and tolerated it well. The patient was placed on home medications. At the time of discharge, the patient was hemodynamically stable, was tolerating PO diet, was voiding urine and passing stool, was ambulating, and was comfortable with adequate pain control. The patient was instructed to follow up with Dr. Brasher within 1-2 weeks after discharge from the hospital. The patient/family felt comfortable with discharge. The patient was previously sent gabapentin, oxycodone, ibuprofen by the office prior to surgery. The patient had no other issues.

## 2023-04-14 NOTE — PROGRESS NOTE ADULT - ASSESSMENT
38F s/p facial feminization surgery (rhinoplasty, coronal brow lift, excision of lip mass, platysmaplasty w/ submandibular gland excision), recovering well.    - Abx: Periop Ancef  - Facial dressing to be removed upon DC  - Apply Aquaphor to lips  - Pain control PRN  - DC today vs. tomorrow, per pt preference 38F s/p facial feminization surgery (rhinoplasty, coronal brow lift, excision of lip mass, platysmaplasty w/ submandibular gland excision), recovering well.    - Abx: Ancef  - Diet: CLD, FLD after 48 hrs  - DC fluids  - Facial dressing to be removed upon DC  - Apply Aquaphor to lips  - Pain control PRN  - DC today vs. tomorrow, per pt preference

## 2023-04-14 NOTE — PROVIDER CONTACT NOTE (OTHER) - SITUATION
Pt stated she had chest pain when nurse entered the room. Pt had not called for assistance. Pt states Tylenol gives her chest pain.

## 2023-04-15 ENCOUNTER — TRANSCRIPTION ENCOUNTER (OUTPATIENT)
Age: 38
End: 2023-04-15

## 2023-04-15 VITALS
TEMPERATURE: 98 F | HEART RATE: 85 BPM | SYSTOLIC BLOOD PRESSURE: 115 MMHG | RESPIRATION RATE: 17 BRPM | OXYGEN SATURATION: 96 % | DIASTOLIC BLOOD PRESSURE: 70 MMHG

## 2023-04-15 RX ORDER — CEFAZOLIN SODIUM 1 G
2000 VIAL (EA) INJECTION ONCE
Refills: 0 | Status: COMPLETED | OUTPATIENT
Start: 2023-04-15 | End: 2023-04-15

## 2023-04-15 RX ORDER — CEFAZOLIN SODIUM 1 G
2000 VIAL (EA) INJECTION EVERY 8 HOURS
Refills: 0 | Status: DISCONTINUED | OUTPATIENT
Start: 2023-04-15 | End: 2023-04-15

## 2023-04-15 RX ORDER — OXYCODONE HYDROCHLORIDE 5 MG/1
1 TABLET ORAL
Qty: 15 | Refills: 0
Start: 2023-04-15

## 2023-04-15 RX ORDER — CEFAZOLIN SODIUM 1 G
VIAL (EA) INJECTION
Refills: 0 | Status: DISCONTINUED | OUTPATIENT
Start: 2023-04-15 | End: 2023-04-15

## 2023-04-15 RX ADMIN — OXYCODONE HYDROCHLORIDE 5 MILLIGRAM(S): 5 TABLET ORAL at 08:43

## 2023-04-15 RX ADMIN — Medication 100 MILLIGRAM(S): at 02:18

## 2023-04-15 RX ADMIN — CHLORHEXIDINE GLUCONATE 15 MILLILITER(S): 213 SOLUTION TOPICAL at 06:04

## 2023-04-15 RX ADMIN — OXYCODONE HYDROCHLORIDE 5 MILLIGRAM(S): 5 TABLET ORAL at 09:40

## 2023-04-15 RX ADMIN — Medication 100 MILLIGRAM(S): at 13:05

## 2023-04-15 RX ADMIN — Medication 5 MILLIGRAM(S): at 13:15

## 2023-04-15 RX ADMIN — Medication 1 ENEMA: at 12:00

## 2023-04-15 RX ADMIN — Medication 101.6 MILLIGRAM(S): at 06:04

## 2023-04-15 RX ADMIN — OXYCODONE HYDROCHLORIDE 5 MILLIGRAM(S): 5 TABLET ORAL at 01:23

## 2023-04-15 RX ADMIN — OXYCODONE HYDROCHLORIDE 5 MILLIGRAM(S): 5 TABLET ORAL at 02:23

## 2023-04-15 NOTE — PROGRESS NOTE ADULT - SUBJECTIVE AND OBJECTIVE BOX
MADDIE CHIN  [id number]    Subjective:    Patient seen and examined, changed head wrap dressing this morning. No complaints.        Objective:  T(C): 36.4 (04-15-23 @ 09:27), Max: 36.7 (04-15-23 @ 06:10)  HR: 77 (04-15-23 @ 09:27) (73 - 89)  BP: 116/73 (04-15-23 @ 09:27) (113/74 - 130/75)  RR: 18 (04-15-23 @ 09:27) (18 - 18)  SpO2: 97% (04-15-23 @ 09:27) (97% - 100%)  Wt(kg): --           04-14 @ 07:01  -  04-15 @ 07:00  --------------------------------------------------------  IN: 720 mL / OUT: 2200 mL / NET: -1480 mL    04-15 @ 07:01  -  04-15 @ 10:54  --------------------------------------------------------  IN: 0 mL / OUT: 200 mL / NET: -200 mL      PHYSICAL EXAM:    Gen: resting in bed comfortably in NAD  Resp: no increased WOB, regular inspiratory effort   Head: Jaw bra applied, head dressing c/d/i. B/l eye swelling appreciate, vision unobstructed. Lip incision c/d/i.              MEDICATIONS  (STANDING):  acetaminophen     Tablet .. 975 milliGRAM(s) Oral every 6 hours  ceFAZolin   IVPB      ceFAZolin   IVPB 2000 milliGRAM(s) IV Intermittent every 8 hours  chlorhexidine 0.12% Liquid 15 milliLiter(s) Swish and Spit two times a day  senna 2 Tablet(s) Oral at bedtime    MEDICATIONS  (PRN):  aluminum hydroxide/magnesium hydroxide/simethicone Suspension 30 milliLiter(s) Oral every 4 hours PRN Dyspepsia  AQUAPHOR (petrolatum Ointment) 1 Application(s) Topical three times a day PRN dryness  artificial tears (preservative free) Ophthalmic Solution 1 Drop(s) Both EYES every 3 hours PRN Dry Eyes  benzocaine/menthol Lozenge 1 Lozenge Oral every 4 hours PRN Sore Throat  bisacodyl 5 milliGRAM(s) Oral daily PRN Constipation  calcium carbonate    500 mG (Tums) Chewable 1 Tablet(s) Chew every 4 hours PRN Dyspepsia  diazepam    Tablet 5 milliGRAM(s) Oral every 6 hours PRN anxiety  diphenhydrAMINE Injectable 25 milliGRAM(s) IV Push every 4 hours PRN Itching  HYDROmorphone  Injectable 0.5 milliGRAM(s) IV Push every 4 hours PRN Severe Pain (7 - 10)  metoclopramide Injectable 10 milliGRAM(s) IV Push every 8 hours PRN Nausea and/or Vomiting  ondansetron    Tablet 4 milliGRAM(s) Oral every 6 hours PRN Nausea and/or Vomiting  oxyCODONE    IR 5 milliGRAM(s) Oral every 4 hours PRN Moderate Pain (4 - 6)  oxyCODONE    IR 10 milliGRAM(s) Oral every 4 hours PRN Severe Pain (7 - 10)             
  INTERVAL EVENTS/SUBJECTIVE: No acute events overnight. Complaining of some b/l eye swelling. Reports some nausea and decreased appetite though improving.    ______________________________________________  OBJECTIVE:   T(C): 36.5 (04-14-23 @ 01:24), Max: 36.9 (04-13-23 @ 12:01)  HR: 78 (04-14-23 @ 01:24) (69 - 86)  BP: 128/75 (04-14-23 @ 01:24) (109/59 - 133/77)  RR: 17 (04-14-23 @ 01:24) (15 - 18)  SpO2: 100% (04-14-23 @ 01:24) (97% - 100%)  Wt(kg): --  CAPILLARY BLOOD GLUCOSE        I&O's Detail    13 Apr 2023 07:01  -  14 Apr 2023 07:00  --------------------------------------------------------  IN:    IV PiggyBack: 250 mL    Lactated Ringers: 330 mL    Oral Fluid: 1440 mL  Total IN: 2020 mL    OUT:    Voided (mL): 2000 mL  Total OUT: 2000 mL    Total NET: 20 mL          Physical exam:  Gen: resting in bed comfortably in NAD  Resp: no increased WOB, regular inspiratory effort   Head: Jaw bra applied, head dressing c/d/i. B/l eye swelling appreciate, vision unobstructed. Lip incision c/d/i.    ______________________________________________  LABS:          _____________________________________________  RADIOLOGY:

## 2023-04-15 NOTE — DISCHARGE NOTE NURSING/CASE MANAGEMENT/SOCIAL WORK - NSDCPEFALRISK_GEN_ALL_CORE
For information on Fall & Injury Prevention, visit: [email or web address] OR  [email or web address] OR  https://www.cdc.gov/steadi/patient.html

## 2023-04-15 NOTE — DISCHARGE NOTE NURSING/CASE MANAGEMENT/SOCIAL WORK - NSDCPNINST_GEN_ALL_CORE
Call MD or come to ED for fever/chills, pain not relieved with pain medicines, difficulty in breathing or persistent nausea/vomiting.

## 2023-04-15 NOTE — DISCHARGE NOTE NURSING/CASE MANAGEMENT/SOCIAL WORK - PATIENT PORTAL LINK FT
You can access the FollowMyHealth Patient Portal offered by [facility] by registering at the following website: [email or web address]. By joining [employer]’s FollowMyHealth portal, you will also be able to view your health information using other applications (apps) compatible with our system.

## 2023-04-17 RX ORDER — DIAZEPAM 5 MG/1
5 TABLET ORAL
Qty: 5 | Refills: 0 | Status: ACTIVE | COMMUNITY
Start: 2023-04-17 | End: 1900-01-01

## 2023-04-28 ENCOUNTER — APPOINTMENT (OUTPATIENT)
Dept: PLASTIC SURGERY | Facility: CLINIC | Age: 38
End: 2023-04-28
Payer: COMMERCIAL

## 2023-04-28 VITALS
HEIGHT: 65 IN | TEMPERATURE: 98.3 F | HEART RATE: 88 BPM | WEIGHT: 148.2 LBS | DIASTOLIC BLOOD PRESSURE: 80 MMHG | SYSTOLIC BLOOD PRESSURE: 113 MMHG | OXYGEN SATURATION: 94 % | BODY MASS INDEX: 24.69 KG/M2

## 2023-04-28 PROCEDURE — 99024 POSTOP FOLLOW-UP VISIT: CPT

## 2023-05-15 ENCOUNTER — APPOINTMENT (OUTPATIENT)
Dept: PLASTIC SURGERY | Facility: CLINIC | Age: 38
End: 2023-05-15

## 2023-05-24 ENCOUNTER — APPOINTMENT (OUTPATIENT)
Dept: PLASTIC SURGERY | Facility: CLINIC | Age: 38
End: 2023-05-24

## 2023-05-24 NOTE — DISCUSSION/SUMMARY
[FreeTextEntry1] : This visit was provided via telehealth using real-time 2 way audio and visual technology. The patient was located at home and I was located at my office at [address] [place] [address], [address] at the time of the telehealth visit.Verbal consent was given by the patient; both the patient and I participated in the telehealth encounter.\par S/P Facial Feminization Surgery on 2023 (Brow, Jawline)\par Patient is without complains and is happy with the result;\par Swelling is still present but improving;\par Incisions are healed\par Hairline with dissolvable sutures and eschars\par Advised how to clean the eschars each day\par Some chin numbness\par Advised to sleep with head at 30 degrees to reduce swelling;\par Tolerating a soft diet; Advance to regular diet;\par Resume normal activities; \par Take Motrin 400mg prn\par Continue with Jawbra at nighttime \par Start nasal saline spray BID \par Follow up in 6 weeks\par

## 2023-06-12 ENCOUNTER — APPOINTMENT (OUTPATIENT)
Dept: PLASTIC SURGERY | Facility: CLINIC | Age: 38
End: 2023-06-12
Payer: COMMERCIAL

## 2023-06-12 PROCEDURE — 99024 POSTOP FOLLOW-UP VISIT: CPT

## 2023-06-14 NOTE — HISTORY OF PRESENT ILLNESS
[Home] : at home, [unfilled] , at the time of the visit. [Medical Office: ([facility])___] : at the medical office located in  [Verbal consent obtained from patient] : the patient, [unfilled] [FreeTextEntry1] : MADDIE is a 38 year old transgender female patient that presents on a telehealth call today for a post operative evaluation s/p facial feminization surgery frontal sinus setback, bilateral orbital reconstruction, bilateral brow lift, bilateral supraorbital contouring, submental fat excision, platysmaplasty, revision rhinoplasty procedure, submucous resection of the septum, bilateral tarsorrhaphy, bilateral submandibular gland resection, digastic muscle resection/plication, bilateral neck liposuction, cartilage grafting for columellar strut, bilateral  grafts, thick graft, an upper lip resection and Z-plasty reconstruction for fat granuloma on 4/13/2023. Patient is happy with the results. \par

## 2023-06-14 NOTE — PHYSICAL EXAM
[de-identified] : Alert, calm, cooperative.\par  [de-identified] : Coronal incision with no signs of wound dehiscence or fluctuance. Mild edema but improving.\par  [de-identified] : Nasal and lip incisions with no signs of wound dehiscence or fluctuance. Mild edema noted but improving.\par  [de-identified] : Submental incision is with no signs of wound dehiscence or fluctuance. Mild edema noted but improving.\par  [de-identified] : Respirations even and unlabored.\par

## 2023-06-14 NOTE — DISCUSSION/SUMMARY
[FreeTextEntry1] : MADDIE is a 38 year old transgender female patient that presents on a telehealth call today for a post operative evaluation s/p facial feminization surgery on 4/13/2023. Patient is healing well. Instructions reviewed and questions/concerns answered.\par \par - You may return to preferred sleeping position.\par - You may massage scars using Mederma scar cream.\par - Wear jaw bra at night time as needed.\par - You may return to normal shower routine.\par - You may apply sunscreen to incision sites when exposed to sunlight.\par - You may advance diet as tolerated. \par - Use saline nasal spray twice a day.\par - Contact us for any questions or concerns.\par - Follow up in 6 weeks.\par \par Patient seen in conjunction with Dr. Brasher.

## 2023-07-11 NOTE — DISCHARGE NOTE PROVIDER - NSDCADMDATE_GEN_ALL_CORE_FT
Called patient and I left a message asking if she still needed us to to send the referral over to [facility] and apologized as I received the message today since it came in after I had left the office for the day.
Patient called in and would like her referral to be sent to [email or web address]. Patient is currently at office, if it could be done asap. Thank you!
13-Apr-2023 05:20

## 2023-07-31 ENCOUNTER — APPOINTMENT (OUTPATIENT)
Dept: PLASTIC SURGERY | Facility: CLINIC | Age: 38
End: 2023-07-31
Payer: COMMERCIAL

## 2023-07-31 PROCEDURE — XXXXX: CPT | Mod: 1L

## 2023-10-23 ENCOUNTER — APPOINTMENT (OUTPATIENT)
Dept: PLASTIC SURGERY | Facility: CLINIC | Age: 38
End: 2023-10-23
Payer: COMMERCIAL

## 2023-10-23 PROCEDURE — XXXXX: CPT | Mod: 1L

## 2024-02-05 ENCOUNTER — APPOINTMENT (OUTPATIENT)
Dept: PLASTIC SURGERY | Facility: CLINIC | Age: 39
End: 2024-02-05

## 2024-05-13 ENCOUNTER — APPOINTMENT (OUTPATIENT)
Dept: PLASTIC SURGERY | Facility: CLINIC | Age: 39
End: 2024-05-13

## 2024-05-31 ENCOUNTER — APPOINTMENT (OUTPATIENT)
Dept: PLASTIC SURGERY | Facility: CLINIC | Age: 39
End: 2024-05-31
Payer: COMMERCIAL

## 2024-05-31 VITALS
TEMPERATURE: 98.1 F | HEART RATE: 86 BPM | WEIGHT: 150 LBS | BODY MASS INDEX: 24.99 KG/M2 | HEIGHT: 65 IN | SYSTOLIC BLOOD PRESSURE: 102 MMHG | DIASTOLIC BLOOD PRESSURE: 61 MMHG | OXYGEN SATURATION: 100 %

## 2024-05-31 DIAGNOSIS — F64.9 GENDER IDENTITY DISORDER, UNSPECIFIED: ICD-10-CM

## 2024-05-31 DIAGNOSIS — Z09 ENCOUNTER FOR FOLLOW-UP EXAMINATION AFTER COMPLETED TREATMENT FOR CONDITIONS OTHER THAN MALIGNANT NEOPLASM: ICD-10-CM

## 2024-05-31 PROCEDURE — 99214 OFFICE O/P EST MOD 30 MIN: CPT

## 2024-06-01 NOTE — DISCUSSION/SUMMARY
[FreeTextEntry1] : This angelika patient began transitioning at 13 years old She has been on hormonal therapy consistently since 18 years old She has been in therapy and was diagnosed with Gender Dysphoria She is concerned about certain facial features that appear masculine despite previous procedures She desires facial feminization surgery and is followed by a Transgender team The following facial features appear masculine and will need to be modified in order for her to achieve a more feminine appearance: -Nose revision minor -Neck scar revision   PSxHx: Surgery:FFS multiple times (Brow, Jawline, Nose) Year 2015 [facility] Complications: None   Surgery:FFS multiple times (Brow, Jawline, Nose) Year 2022 [facility]  Surgeon: Dr. Jose Luis Brasher Complications: None  PMedHx: None   Medications: -Estrogen -Spironolactone 200mg QD   FH: noncontributory   SH: occasional marijuana use,  no secondary tobacco use,   ROS: General health / Constitutional      no fever, no chills, no unusual weight changes, no energy level changes, no night sweats Skin       No color or pigmentation changes, no pruritis, no rashes, no ulcers, Hair       No changes in color, texture,  distribution, loss Nails       No color changes, brittleness, infection Head       No headaches, no new jaw pain Eyes       Good visual acuity, no color blindness, no corrective lenses, no photophobia, no diplopia, no blurred vision, no infection, pain, no medications, Ear      no tinnitus, no discharge, no pain, no medications Nose      no epistaxis, no rhinorrhea, no rhinitis, no pain, Mouth & Throat      no gingivitis, no gingival bleeding, no ulcers, no voice changes, no changes in oral mucosa or tongue Neck      no stiffness, no pain, no lymphadenitis, no thyroid disorders, Respiratory      no cough, no dyspnea, no wheezing,  no chest pain, cyanosis, no pneumonia, no asthma, Cardiovascular      no chest pain, no palpitations, no irregular rhythm, no tachycardia, no bradycardia, no heart failure, no dyspnea on exertion (GAN), no edema Gastrointestinal      no nausea / vomiting, no dysphagia, no reflux / GERD, no abdominal pain, no jaundice Musculoskeletal      no pain in muscles, bones, or joints; no fractures, no dislocations, no muscular weakness, no atrophy Neurological      no paresis, no paralysis, no paresthesia, no seizures, no dizziness, no syncope, no ataxia, no tremor Psychological      no childhood behavioral problems, no irritability, no sleep changes Hematological      no anemia, no bruising, no bleeding tendencies,   PHYSICAL EXAM General WDWN, in no distress,  A & O x 3 (person, place, time) HEENT Head: AT/NC (atraumatic, normocephalic), including TMJ, sensory and motor; Improved brow shape Prominent, bossed brow (Type III) and lateral orbital rim Eyes: EOMI, PERRLA Ears: exterior, nl hearing, Nose: wide nostrils, foreshortened nose  Throat & mouth: gd palate elevation, nl tongue mobility, nl tonsil size improved jawline shape   Neck: no masses, nl pulses, excess submental fat with neck ptosis and lack of cervical-mental angle + neck scar dog ears with redundant skin   We had a 35 minute meeting with the patient discussing diagnosis and medical management issues and outcomes. Revision FFS: -Rhinoplasty revision for nostrils -neck scar revision   She will need to provide a letter from her therapist and hormone provider  CPT 87018: Revision rhinoplasty CPT 43804: neck scar revision  78652 (Revision Rhinoplasty): This patients nose has characteristics of a male nose despite previous procedures. . The male nose is often larger and wider with a more bulbous nasal tip. These male nasal features make her feel masculine which exacerbates her gender dysphoria. A rhinoplasty would be beneficial to feminize her nose by creating a smaller nose and more delicate, softer nasal tip. The lateral dorsal shape will also be feminized by the rhinoplasty. This patients nasal septum is shaped like a male septum. The septum is the supportive pole that holds up the structure of the nasal pyramid. In this case the septum will also be used to provide cartilage tissue necessary for nasal grafts. This septoplasty is required to modify the septum to create a straight nose with good functional breathing while taking away the characteristics of a male nose.  Patient requires a revision facial feminization procedure. Although she had previous rhinoplasty as part of her original FFS procedures, there are aspects of this region that still are male appearing. This male appearance cause her feelings of dysphoria. She has allowed appropriate time for healing, and now desires correction of this male feature with a feminization procedure. The goal of this procedure is to complete her facial transition. It is not anticipated that she will need this procedure revised.  It is not anticipated that she will need additional FFS procedures. Ultimately, the sense of dysphoria is based on the patient and her therapists guidance, we feel that this procedure will help her and will be successful in feminizing this feature and her face.   CPT 50910: neck scar revision: Patient has neck scar with redundancy of skin that affects her appearance and dysphoria. A scar revision would help relieve this. Patient requires a revision facial feminization procedure. This particular procedure was never performed as part of her original FFS procedures. After allowing appropriate time for healing, she has realized that she still has a feeling of dysphoria related to this male appearing region.. She desires correction of this male feature with a feminization procedure. The goal of this procedure is to complete her facial transition. It is not anticipated that she will need this procedure revised.  It is not anticipated that she will need additional FFS procedures. Ultimately, the sense of dysphoria is based on the patient and her therapists guidance, we feel that this procedure will help her and will be successful in feminizing this feature and her face.

## 2024-06-27 ENCOUNTER — NON-APPOINTMENT (OUTPATIENT)
Age: 39
End: 2024-06-27

## 2024-07-23 NOTE — ASU PATIENT PROFILE, ADULT - ARRIVAL TIME
[FreeTextEntry1] : Assessment:\par  1.  Coronary Atherosclerosis\par  2.  Chest pressure - some improvement with coreg\par  3.  + Calcium score\par  4.  Family history of CAD\par  5.  RA\par  6.  Anxiety.\par  \par  \par   Plan:\par  1.  Continue medical therapy for now with:\par  - antiplatelet - aspirin \par  - statin Atorvastatin  80mg daily \par  - beta blocker - coreg \par  2.  She had a normal coronary CTA with no sig stenosis, echo was normal, ECG was normal March 30th, she has no angina.  She may proceed with planned hand surgery without any additional cardiac testing.\par  3.  Labs to day to check lipid panel 
[FreeTextEntry1] : Assessment:\par  1.  Coronary Atherosclerosis\par  2.  Chest pressure - some improvement with coreg\par  3.  + Calcium score\par  4.  Family history of CAD\par  5.  RA\par  6.  Anxiety.\par  \par  \par   Plan:\par  1.  Continue medical therapy for now with:\par  - antiplatelet - aspirin \par  - statin Atorvastatin  80mg daily \par  - beta blocker - coreg \par  2.  She had a normal coronary CTA with no sig stenosis, echo was normal, ECG was normal March 30th, she has no angina.  She may proceed with planned hand surgery without any additional cardiac testing.\par  3.  Labs to day to check lipid panel 
05:15

## 2024-08-26 ENCOUNTER — APPOINTMENT (OUTPATIENT)
Dept: PLASTIC SURGERY | Facility: CLINIC | Age: 39
End: 2024-08-26

## 2024-08-26 PROCEDURE — 99213 OFFICE O/P EST LOW 20 MIN: CPT

## 2024-08-27 NOTE — HISTORY OF PRESENT ILLNESS
[Medical Office: ([facility])___] : at the medical office located in  [Verbal consent obtained from patient] : the patient, [unfilled]

## 2024-12-09 ENCOUNTER — APPOINTMENT (OUTPATIENT)
Dept: PLASTIC SURGERY | Facility: CLINIC | Age: 39
End: 2024-12-09

## (undated) DEVICE — SUCTION YANKAUER VITAL VUE

## (undated) DEVICE — VENODYNE/SCD SLEEVE CALF MEDIUM

## (undated) DEVICE — PROTECTOR CORNEAL BLUE ADULT

## (undated) DEVICE — DRAPE UNDER BUTTOCKS W SCREEN

## (undated) DEVICE — GLV 7.5 PROTEXIS (WHITE)

## (undated) DEVICE — CLIP APPLIER CODMAN SCALP

## (undated) DEVICE — STAPLER SKIN VISI-STAT 35 WIDE

## (undated) DEVICE — BIPOLAR FORCEP KIRWAN CUSHING 6" X 1MM W 12FT CORD (GREEN)

## (undated) DEVICE — GLV 7 PROTEXIS (WHITE)

## (undated) DEVICE — BUR LINVATEC OVAL MEDIUM 4MM CARBIDE

## (undated) DEVICE — DRAIN RESERVOIR FOR JACKSON PRATT 100CC CARDINAL

## (undated) DEVICE — SYR LUER LOK 5CC

## (undated) DEVICE — CATH IV SAFE INSYTE 14G X 1.75" (ORANGE)

## (undated) DEVICE — BUR STRYKER OVAL CARBIDE 4MM

## (undated) DEVICE — BLADE ORAL INTRA THIN 12MM CUT EDGE

## (undated) DEVICE — DRAPE MAGNETIC INSTRUMENT MEDIUM

## (undated) DEVICE — RASP STRYKER LARGE TEAR CROSSCUT 14X7MM DISP

## (undated) DEVICE — DRAIN JACKSON PRATT 10MM FLAT FULL NO TROCAR

## (undated) DEVICE — DRSG DERMABOND 0.7ML

## (undated) DEVICE — MIDAS REX LEGEND TAPERED SM BORE 2.3MM X 8CM

## (undated) DEVICE — ELCTR BOVIE TIP BLADE INSULATED 6.5" EDGE

## (undated) DEVICE — BLADE MANDIBULAR OSTEOTOMY OFFSET 9.5X0.38X12.2MM

## (undated) DEVICE — FOLEY TRAY 16FR 5CC LF UMETER CLOSED

## (undated) DEVICE — DRAPE INSTRUMENT POUCH 6.75" X 11"

## (undated) DEVICE — SYR LUER LOK 10CC

## (undated) DEVICE — DRSG MASTISOL

## (undated) DEVICE — NDL HYPO REGULAR BEVEL 25G X 1.5" (BLUE)

## (undated) DEVICE — SAW BLADE STRYKER RECIPROCATING 22.5MMX0.38MM

## (undated) DEVICE — AESCULAP SCALPFIX 10 CLIPS

## (undated) DEVICE — SAW BLADE STRYKER RECIPROCATING 27MMX0.38MM

## (undated) DEVICE — ELCTR BOVIE PENCIL BLADE 10FT

## (undated) DEVICE — RUBBER BANDS STERILE

## (undated) DEVICE — DRAPE SPLIT SHEET 77" X 120"

## (undated) DEVICE — ELCTR COLORADO 3CM